# Patient Record
Sex: FEMALE | Race: WHITE | Employment: PART TIME | URBAN - METROPOLITAN AREA
[De-identification: names, ages, dates, MRNs, and addresses within clinical notes are randomized per-mention and may not be internally consistent; named-entity substitution may affect disease eponyms.]

---
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
## 2024-08-24 ENCOUNTER — APPOINTMENT (OUTPATIENT)
Dept: GENERAL RADIOLOGY | Age: 19
End: 2024-08-24
Payer: COMMERCIAL

## 2024-08-24 ENCOUNTER — HOSPITAL ENCOUNTER (EMERGENCY)
Age: 19
Discharge: HOME | End: 2024-08-24
Payer: COMMERCIAL

## 2024-08-24 VITALS
TEMPERATURE: 98.1 F | OXYGEN SATURATION: 100 % | RESPIRATION RATE: 18 BRPM | SYSTOLIC BLOOD PRESSURE: 106 MMHG | BODY MASS INDEX: 25.2 KG/M2 | DIASTOLIC BLOOD PRESSURE: 72 MMHG | HEART RATE: 87 BPM | WEIGHT: 125 LBS | HEIGHT: 59 IN

## 2024-08-24 DIAGNOSIS — S83.91XA SPRAIN OF RIGHT KNEE, UNSPECIFIED LIGAMENT, INITIAL ENCOUNTER: Primary | ICD-10-CM

## 2024-08-24 DIAGNOSIS — S80.211A ABRASION, RIGHT KNEE, INITIAL ENCOUNTER: ICD-10-CM

## 2024-08-24 PROCEDURE — 73562 X-RAY EXAM OF KNEE 3: CPT

## 2024-08-24 PROCEDURE — 99283 EMERGENCY DEPT VISIT LOW MDM: CPT

## 2024-08-24 ASSESSMENT — PAIN SCALES - GENERAL: PAINLEVEL_OUTOF10: 8

## 2024-08-24 ASSESSMENT — LIFESTYLE VARIABLES
HOW MANY STANDARD DRINKS CONTAINING ALCOHOL DO YOU HAVE ON A TYPICAL DAY: PATIENT DOES NOT DRINK
HOW OFTEN DO YOU HAVE A DRINK CONTAINING ALCOHOL: NEVER

## 2024-08-24 ASSESSMENT — PAIN - FUNCTIONAL ASSESSMENT: PAIN_FUNCTIONAL_ASSESSMENT: 0-10

## 2024-08-24 NOTE — ED TRIAGE NOTES
Pt to ED to triage with c/o mechanical  fall on pavement when loose pavement caved in. Pt has abrasion to the right knee states there is a hole underneath a skin flap on her knee. Bleeding controlled. Pt tolerating cleaning of wound in triage with some encouragement. Pt has right ankle swelling as well but states no ankle pain. States from her knee down the leg feels asleep. Stated at first no sensation but when using sharp dull test pt was responsive immediately to sharp. Pulses present in the foot and behind knee. Pt states no loc did not hit head. Last TDAP was likely when she had her child a year ago pt unsure.

## 2024-08-24 NOTE — ED PROVIDER NOTES
Emergency Department Provider Note       PCP: Olena Snyder APRN - TALI   Age: 19 y.o.   Sex: female     DISPOSITION Discharge - Pending Orders Complete 08/24/2024 08:16:19 PM  Condition at Disposition: Good       ICD-10-CM    1. Sprain of right knee, unspecified ligament, initial encounter  S83.91XA       2. Abrasion, right knee, initial encounter  S80.211A           Medical Decision Making     Patient is a 19-year-old female who presents with right knee injury after trip and fall.  She landed on the pavement on her right knee and is having pain especially with full extension of the knee and bearing weight.  She does have abrasion to the anterior aspect of the knee.  She is up-to-date on her tetanus shot.  She is afebrile nontoxic in appearance, vital signs within appropriate limits.  She was offered medication for pain but she refused stating that she does not like to take anything she does not have to.  Wound was cleaned and dressing applied.  X-rays do not show any acute fractures however given concern for sprain she was to placed in knee immobilizer and given crutches will be discharged home instructed to RICE, take over-the-counter anti-inflammatories and follow-up with her doctor in 1 week for any further evaluation.  Discussed reasons to return to the ED.  All agreeable to plan.     1 acute, uncomplicated illness or injury.  Over the counter drug management performed.  Shared medical decision making was utilized in creating the patients health plan today.    I independently ordered and reviewed each unique test.         I interpreted the X-rays x-rays of the right knee were negative for any obvious fracture.              History     Patient is a 19-year-old female who presents with right knee pain and injury.  She was walking while holding a baby and she tripped on the pavement and fell landing on her right knee.  She has abrasion to the right knee and reports having pain with range of motion and

## 2024-08-25 NOTE — ED NOTES
I have reviewed discharge instructions with the patient.  The patient verbalized understanding.    Patient left ED via Discharge Method: wheelchair to Home with family.    Opportunity for questions and clarification provided.       Patient given 0 scripts.         To continue your aftercare when you leave the hospital, you may receive an automated call from our care team to check in on how you are doing.  This is a free service and part of our promise to provide the best care and service to meet your aftercare needs.” If you have questions, or wish to unsubscribe from this service please call [phone].  Thank you for Choosing our [facility] Emergency Department.        Vee Boateng LPN  08/24/24 0629

## 2024-08-25 NOTE — DISCHARGE INSTRUCTIONS
Your x-rays did not show any broken bones however I do suspect you sprained the ligaments in your knee.  We are sending you home with a knee brace that you can wear when you are up, do not need to wear it if you are sitting or resting.  Recommend use of crutches for the next couple of days to allow your knee to rest and alleviate putting pressure on the area.  Recommend elevating icing on and off and taking over-the-counter Motrin or Tylenol as needed.  Follow-up with your doctor in 1 week for reevaluation.

## 2024-12-03 ENCOUNTER — HOSPITAL ENCOUNTER (EMERGENCY)
Age: 19
Discharge: HOME | End: 2024-12-03
Attending: EMERGENCY MEDICINE
Payer: COMMERCIAL

## 2024-12-03 VITALS
TEMPERATURE: 98.2 F | DIASTOLIC BLOOD PRESSURE: 55 MMHG | SYSTOLIC BLOOD PRESSURE: 106 MMHG | RESPIRATION RATE: 18 BRPM | HEART RATE: 83 BPM | OXYGEN SATURATION: 100 %

## 2024-12-03 DIAGNOSIS — Z32.01 POSITIVE PREGNANCY TEST: Primary | ICD-10-CM

## 2024-12-03 LAB
BILIRUB UR QL: NEGATIVE
GLUCOSE UR QL STRIP.AUTO: NEGATIVE MG/DL
HCG UR QL: POSITIVE
KETONES UR-MCNC: NEGATIVE MG/DL
LEUKOCYTE ESTERASE UR QL STRIP: ABNORMAL
NITRITE UR QL: POSITIVE
PH UR: 6.5 (ref 5–9)
PROT UR QL: NEGATIVE MG/DL
RBC # UR STRIP: NEGATIVE
SERVICE CMNT-IMP: ABNORMAL
SP GR UR: 1.02 (ref 1–1.02)
UROBILINOGEN UR QL: 0.2 EU/DL (ref 0.2–1)

## 2024-12-03 PROCEDURE — 81025 URINE PREGNANCY TEST: CPT

## 2024-12-03 PROCEDURE — 99282 EMERGENCY DEPT VISIT SF MDM: CPT

## 2024-12-03 PROCEDURE — 81003 URINALYSIS AUTO W/O SCOPE: CPT

## 2024-12-03 NOTE — ED PROVIDER NOTES
Emergency Department Provider Note       PCP: None, None   Age: 19 y.o.   Sex: female     DISPOSITION Decision To Discharge 12/03/2024 07:03:49 PM    ICD-10-CM    1. Positive pregnancy test  Z32.01           Medical Decision Making     Pregnancy test is positive, urinalysis no evidence of infection.  Patient is established with the [facility].  Referred there for follow-up.     1 acute, uncomplicated illness or injury.  Shared medical decision making was utilized in creating the patients health plan today.  I independently ordered and reviewed each unique test.                         History     Patient presents with a 1 week history of nausea and intermittent vomiting.  She does have a history of contact with a sick child with similar symptoms but is concerned about the possibility of pregnancy.  She has on Depo-Provera and has not had a period in since receiving the medications.  She is due for repeat Depo-Provera injection on 22 December.  She denies any abdominal pain.  She denies any fever or chills.  Review of systems otherwise negative.    The history is provided by the patient.     Physical Exam     Vitals signs and nursing note reviewed:  Vitals:    12/03/24 1821   BP: 97/62   Pulse: 85   Resp: 16   Temp: 97.7 °F (36.5 °C)   SpO2: 99%      Physical Exam  Vitals and nursing note reviewed.   Constitutional:       General: She is not in acute distress.     Appearance: Normal appearance. She is normal weight. She is not ill-appearing or toxic-appearing.   HENT:      Head: Normocephalic and atraumatic.      Mouth/Throat:      Mouth: Mucous membranes are moist.      Pharynx: Oropharynx is clear. No oropharyngeal exudate or posterior oropharyngeal erythema.   Eyes:      Extraocular Movements: Extraocular movements intact.      Conjunctiva/sclera: Conjunctivae normal.      Pupils: Pupils are equal, round, and reactive to light.   Cardiovascular:      Rate and Rhythm: Normal rate.   Pulmonary:

## 2025-08-17 ENCOUNTER — HOSPITAL ENCOUNTER (EMERGENCY)
Age: 20
Discharge: HOME | End: 2025-08-17
Attending: STUDENT IN AN ORGANIZED HEALTH CARE EDUCATION/TRAINING PROGRAM
Payer: COMMERCIAL

## 2025-08-17 VITALS
HEIGHT: 59 IN | HEART RATE: 98 BPM | BODY MASS INDEX: 32.25 KG/M2 | WEIGHT: 160 LBS | SYSTOLIC BLOOD PRESSURE: 108 MMHG | DIASTOLIC BLOOD PRESSURE: 81 MMHG | OXYGEN SATURATION: 98 % | RESPIRATION RATE: 18 BRPM | TEMPERATURE: 98.1 F

## 2025-08-17 DIAGNOSIS — J02.9 ACUTE PHARYNGITIS, UNSPECIFIED ETIOLOGY: Primary | ICD-10-CM

## 2025-08-17 LAB — STREP, MOLECULAR: NOT DETECTED

## 2025-08-17 PROCEDURE — 99283 EMERGENCY DEPT VISIT LOW MDM: CPT

## 2025-08-17 PROCEDURE — 87651 STREP A DNA AMP PROBE: CPT

## 2025-08-17 RX ORDER — NAPROXEN 250 MG/1
250 TABLET ORAL 2 TIMES DAILY WITH MEALS
Qty: 10 TABLET | Refills: 0 | Status: SHIPPED | OUTPATIENT
Start: 2025-08-17

## 2025-08-17 ASSESSMENT — PAIN SCALES - GENERAL: PAINLEVEL_OUTOF10: 9

## 2025-08-17 ASSESSMENT — PAIN DESCRIPTION - LOCATION: LOCATION: THROAT

## 2025-08-17 ASSESSMENT — LIFESTYLE VARIABLES
HOW OFTEN DO YOU HAVE A DRINK CONTAINING ALCOHOL: NEVER
HOW MANY STANDARD DRINKS CONTAINING ALCOHOL DO YOU HAVE ON A TYPICAL DAY: PATIENT DOES NOT DRINK

## 2025-08-17 ASSESSMENT — PAIN DESCRIPTION - DESCRIPTORS: DESCRIPTORS: BURNING

## 2025-08-17 ASSESSMENT — PAIN - FUNCTIONAL ASSESSMENT: PAIN_FUNCTIONAL_ASSESSMENT: 0-10
